# Patient Record
Sex: FEMALE | Race: WHITE | HISPANIC OR LATINO | URBAN - METROPOLITAN AREA
[De-identification: names, ages, dates, MRNs, and addresses within clinical notes are randomized per-mention and may not be internally consistent; named-entity substitution may affect disease eponyms.]

---

## 2024-09-26 ENCOUNTER — HOSPITAL ENCOUNTER (EMERGENCY)
Facility: HOSPITAL | Age: 29
Discharge: HOME | End: 2024-09-26
Payer: MEDICAID

## 2024-09-26 VITALS
BODY MASS INDEX: 36.16 KG/M2 | SYSTOLIC BLOOD PRESSURE: 132 MMHG | RESPIRATION RATE: 20 BRPM | OXYGEN SATURATION: 100 % | TEMPERATURE: 98 F | WEIGHT: 225 LBS | HEIGHT: 66 IN | HEART RATE: 100 BPM | DIASTOLIC BLOOD PRESSURE: 86 MMHG

## 2024-09-26 DIAGNOSIS — O46.90 VAGINAL BLEEDING IN PREGNANCY: ICD-10-CM

## 2024-09-26 DIAGNOSIS — O20.0 THREATENED MISCARRIAGE: Primary | ICD-10-CM

## 2024-09-26 LAB
ABO + RH BLD: NORMAL
ALBUMIN SERPL BCP-MCNC: 4.5 G/DL (ref 3.5–5.2)
ALP SERPL-CCNC: 91 U/L (ref 38–126)
ALT SERPL W/O P-5'-P-CCNC: 17 U/L (ref 10–44)
ANION GAP SERPL CALC-SCNC: 8 MMOL/L (ref 8–16)
AST SERPL-CCNC: 22 U/L (ref 15–46)
B-HCG UR QL: NEGATIVE
BACTERIA #/AREA URNS AUTO: ABNORMAL /HPF
BASOPHILS # BLD AUTO: 0.07 K/UL (ref 0–0.2)
BASOPHILS NFR BLD: 0.6 % (ref 0–1.9)
BILIRUB SERPL-MCNC: 0.4 MG/DL (ref 0.1–1)
BILIRUB UR QL STRIP: NEGATIVE
CALCIUM SERPL-MCNC: 9.2 MG/DL (ref 8.7–10.5)
CAOX CRY UR QL COMP ASSIST: ABNORMAL
CHLORIDE SERPL-SCNC: 103 MMOL/L (ref 95–110)
CLARITY UR REFRACT.AUTO: CLEAR
CO2 SERPL-SCNC: 26 MMOL/L (ref 23–29)
COLOR UR AUTO: YELLOW
CREAT SERPL-MCNC: 0.55 MG/DL (ref 0.5–1.4)
CTP QC/QA: YES
DIFFERENTIAL METHOD BLD: ABNORMAL
EOSINOPHIL # BLD AUTO: 0.3 K/UL (ref 0–0.5)
EOSINOPHIL NFR BLD: 2.3 % (ref 0–8)
ERYTHROCYTE [DISTWIDTH] IN BLOOD BY AUTOMATED COUNT: 12.2 % (ref 11.5–14.5)
EST. GFR  (NO RACE VARIABLE): >60 ML/MIN/1.73 M^2
GLUCOSE SERPL-MCNC: 92 MG/DL (ref 70–110)
GLUCOSE UR QL STRIP: NEGATIVE
HCG INTACT+B SERPL-ACNC: 5262 MIU/ML
HCT VFR BLD AUTO: 40.1 % (ref 37–48.5)
HGB BLD-MCNC: 13.8 G/DL (ref 12–16)
HGB UR QL STRIP: ABNORMAL
IMM GRANULOCYTES # BLD AUTO: 0.07 K/UL (ref 0–0.04)
IMM GRANULOCYTES NFR BLD AUTO: 0.6 % (ref 0–0.5)
KETONES UR QL STRIP: NEGATIVE
LEUKOCYTE ESTERASE UR QL STRIP: NEGATIVE
LYMPHOCYTES # BLD AUTO: 3.4 K/UL (ref 1–4.8)
LYMPHOCYTES NFR BLD: 28.2 % (ref 18–48)
MCH RBC QN AUTO: 28.1 PG (ref 27–31)
MCHC RBC AUTO-ENTMCNC: 34.4 G/DL (ref 32–36)
MCV RBC AUTO: 82 FL (ref 82–98)
MICROSCOPIC COMMENT: ABNORMAL
MONOCYTES # BLD AUTO: 0.8 K/UL (ref 0.3–1)
MONOCYTES NFR BLD: 6.6 % (ref 4–15)
NEUTROPHILS # BLD AUTO: 7.5 K/UL (ref 1.8–7.7)
NEUTROPHILS NFR BLD: 61.7 % (ref 38–73)
NITRITE UR QL STRIP: NEGATIVE
NRBC BLD-RTO: 0 /100 WBC
PH UR STRIP: 6 [PH] (ref 5–8)
PLATELET # BLD AUTO: 229 K/UL (ref 150–450)
PMV BLD AUTO: 10 FL (ref 9.2–12.9)
POTASSIUM SERPL-SCNC: 3.8 MMOL/L (ref 3.5–5.1)
PROT SERPL-MCNC: 7.7 G/DL (ref 6–8.4)
PROT UR QL STRIP: NEGATIVE
RBC # BLD AUTO: 4.91 M/UL (ref 4–5.4)
RBC #/AREA URNS AUTO: 6 /HPF (ref 0–4)
SODIUM SERPL-SCNC: 137 MMOL/L (ref 136–145)
SP GR UR STRIP: >=1.03 (ref 1–1.03)
URN SPEC COLLECT METH UR: ABNORMAL
UROBILINOGEN UR STRIP-ACNC: NEGATIVE EU/DL
UUN UR-MCNC: 10 MG/DL (ref 7–17)
WBC # BLD AUTO: 12.2 K/UL (ref 3.9–12.7)
WBC #/AREA URNS AUTO: 4 /HPF (ref 0–5)

## 2024-09-26 PROCEDURE — 81025 URINE PREGNANCY TEST: CPT | Mod: ER | Performed by: PHYSICIAN ASSISTANT

## 2024-09-26 PROCEDURE — 81000 URINALYSIS NONAUTO W/SCOPE: CPT | Mod: ER | Performed by: PHYSICIAN ASSISTANT

## 2024-09-26 PROCEDURE — 99284 EMERGENCY DEPT VISIT MOD MDM: CPT | Mod: 25,ER

## 2024-09-26 PROCEDURE — 86901 BLOOD TYPING SEROLOGIC RH(D): CPT | Performed by: PHYSICIAN ASSISTANT

## 2024-09-26 PROCEDURE — 85025 COMPLETE CBC W/AUTO DIFF WBC: CPT | Mod: ER | Performed by: PHYSICIAN ASSISTANT

## 2024-09-26 PROCEDURE — 86900 BLOOD TYPING SEROLOGIC ABO: CPT | Performed by: PHYSICIAN ASSISTANT

## 2024-09-26 PROCEDURE — 80053 COMPREHEN METABOLIC PANEL: CPT | Mod: ER | Performed by: PHYSICIAN ASSISTANT

## 2024-09-26 PROCEDURE — 96360 HYDRATION IV INFUSION INIT: CPT | Mod: ER

## 2024-09-26 PROCEDURE — 84702 CHORIONIC GONADOTROPIN TEST: CPT | Mod: ER | Performed by: PHYSICIAN ASSISTANT

## 2024-09-26 PROCEDURE — 25000003 PHARM REV CODE 250: Mod: ER | Performed by: PHYSICIAN ASSISTANT

## 2024-09-26 RX ADMIN — SODIUM CHLORIDE 1000 ML: 9 INJECTION, SOLUTION INTRAVENOUS at 05:09

## 2024-09-26 NOTE — Clinical Note
"Anan "Anna" Ángel Alcaraz was seen and treated in our emergency department on 9/26/2024.  She may return to work on 09/30/2024.       If you have any questions or concerns, please don't hesitate to call.      Lindsay Keys PA-C"

## 2024-09-26 NOTE — ED PROVIDER NOTES
Encounter Date: 2024       History     Chief Complaint   Patient presents with    Vaginal Bleeding     Pt requesting mother translate. Pt reports abd pain while at work and then vaginal bleeding. LMP in July. Pt scheduled to see OBGYN on . PA in room.     29-year-old female presenting to ED accompanied by her mother who is assisting with translation reporting that patient while at work earlier today noticed slight lower abdominal discomfort progressive throughout the day.  Patient reports going home just prior to arrival and noticing small amount of blood on toilet paper and when standing up in the toilet.  Patient reports discomfort to lower abdomen currently 5/10.  Patient denies any associated nausea, vomiting or diarrhea.  Patient reports she is currently pregnant, LMP 2024, not sure of exact date.  Patient does have an appointment to establish care with OBGYN next week.  Patient with history .  First pregnancy was vaginal delivery with no complications reported.    The history is provided by the patient. A  was used.     Review of patient's allergies indicates:  No Known Allergies  History reviewed. No pertinent past medical history.  History reviewed. No pertinent surgical history.  No family history on file.  Social History     Tobacco Use    Smoking status: Never    Smokeless tobacco: Never   Substance Use Topics    Alcohol use: Never     Review of Systems   Constitutional:  Negative for chills, diaphoresis, fatigue and fever.   HENT:  Negative for congestion, ear pain, sinus pain, sore throat and trouble swallowing.    Eyes:  Negative for photophobia, pain, redness and visual disturbance.   Respiratory:  Negative for cough, shortness of breath, wheezing and stridor.    Cardiovascular:  Negative for chest pain and palpitations.   Gastrointestinal:  Positive for abdominal pain. Negative for constipation, diarrhea, nausea and vomiting.   Endocrine: Negative.     Genitourinary:  Positive for vaginal bleeding. Negative for decreased urine volume, difficulty urinating, dysuria, flank pain, hematuria, pelvic pain, urgency, vaginal discharge and vaginal pain.   Musculoskeletal:  Negative for back pain, myalgias and neck pain.   Skin: Negative.    Allergic/Immunologic: Negative.    Neurological:  Negative for dizziness, seizures, speech difficulty, weakness, light-headedness, numbness and headaches.   Hematological: Negative.    Psychiatric/Behavioral: Negative.     All other systems reviewed and are negative.      Physical Exam     Initial Vitals [09/26/24 1658]   BP Pulse Resp Temp SpO2   132/86 110 20 98.1 °F (36.7 °C) 100 %      MAP       --         Physical Exam    Nursing note and vitals reviewed.  Constitutional: Vital signs are normal. She appears well-developed and well-nourished. She is not diaphoretic. No distress.   29-year-old female sitting upright appearing slightly anxious although no acute distress, nontoxic, breathing comfortably on room air, normal steady gait   HENT:   Head: Normocephalic and atraumatic.   Right Ear: Hearing and external ear normal.   Left Ear: Hearing and external ear normal.   Nose: Nose normal.   Mouth/Throat: Oropharynx is clear and moist.   Eyes: Conjunctivae and EOM are normal. Pupils are equal, round, and reactive to light.   Neck: Trachea normal. Neck supple.   Normal range of motion.  Cardiovascular:  Normal rate, regular rhythm and normal pulses.           Pulmonary/Chest: Effort normal. No accessory muscle usage. No tachypnea. No respiratory distress.   Abdominal: Abdomen is soft. There is no abdominal tenderness.   Abdomen soft and nontender throughout all quadrants, no guarding or rebound, no rigidity, no signs of acute abdomen There is no rebound and no guarding.   Musculoskeletal:         General: No tenderness or edema. Normal range of motion.      Cervical back: Normal range of motion and neck supple.     Neurological: She  is alert and oriented to person, place, and time. She has normal strength. She displays no tremor. She exhibits normal muscle tone. She displays no seizure activity. Coordination normal. GCS score is 15. GCS eye subscore is 4. GCS verbal subscore is 5. GCS motor subscore is 6.   Skin: Skin is warm and dry. Capillary refill takes less than 2 seconds.         ED Course   Procedures  Labs Reviewed   CBC W/ AUTO DIFFERENTIAL - Abnormal       Result Value    WBC 12.20      RBC 4.91      Hemoglobin 13.8      Hematocrit 40.1      MCV 82      MCH 28.1      MCHC 34.4      RDW 12.2      Platelets 229      MPV 10.0      Immature Granulocytes 0.6 (*)     Gran # (ANC) 7.5      Immature Grans (Abs) 0.07 (*)     Lymph # 3.4      Mono # 0.8      Eos # 0.3      Baso # 0.07      nRBC 0      Gran % 61.7      Lymph % 28.2      Mono % 6.6      Eosinophil % 2.3      Basophil % 0.6      Differential Method Automated      Narrative:     Release to patient->Immediate   COMPREHENSIVE METABOLIC PANEL    Sodium 137      Potassium 3.8      Chloride 103      CO2 26      Glucose 92      BUN 10      Creatinine 0.55      Calcium 9.2      Total Protein 7.7      Albumin 4.5      Total Bilirubin 0.4      Alkaline Phosphatase 91      AST 22      ALT 17      Anion Gap 8      eGFR >60.0      Narrative:     Release to patient->Immediate   HCG, QUANTITATIVE    HCG Quant 5262      Narrative:     Release to patient->Immediate   URINALYSIS, REFLEX TO URINE CULTURE   URINALYSIS MICROSCOPIC   POCT URINE PREGNANCY    POC Preg Test, Ur Negative       Acceptable Yes     GROUP & RH          Imaging Results              US OB <14 Wks, TransAbd, Single Gestation (No Result on File)                      Medications   sodium chloride 0.9% bolus 1,000 mL 1,000 mL (1,000 mLs Intravenous New Bag 9/26/24 2053)     Medical Decision Making  29-year-old female reporting to ED reporting that she was pregnant.  Does not report any home positive UPT.  Last  menstrual cycle at some point in July, unknown definitive date.  Patient with intermittent lower abdominal discomfort and noticed spotting blood this afternoon when using restroom and wiping.  Abdominal exam completely unremarkable.  No current vaginal bleeding in the emergency department.  Vital signs stable.  Labs ordered preemptively for evaluation including ultrasound.  UPT resulted as negative.  HCG quite low as well.  With negative UPT ultrasound cancelled.  Discussed with patient and family at bedside possible threatened miscarriage versus ectopic pregnancy.  Patient has an appointment with her OBGYN next week which is reasonable although patient and mother instructed to call clinic tomorrow to see if they can be seen sooner.  Labs overall stable and unremarkable at this time.  No further emergency intervention currently indicated.  Patient and mother agreeable to care plan and appreciative for care today.  Patient discharged in stable condition with all questions answered.        Amount and/or Complexity of Data Reviewed  Labs: ordered. Decision-making details documented in ED Course.  Radiology: ordered.               ED Course as of 09/26/24 1838   Thu Sep 26, 2024   1700 Temp: 98.1 °F (36.7 °C) [MC]   1700 BP: 132/86 [MC]   1824 hCG Qualitative, Urine: Negative [MC]   1825 Beta HCG Quant: 5262 []      ED Course User Index  [] Lindsay Keys, PAJetC                           Clinical Impression:  Final diagnoses:  [O46.90] Vaginal bleeding in pregnancy  [O20.0] Threatened miscarriage (Primary)          ED Disposition Condition    Discharge Stable          ED Prescriptions    None       Follow-up Information       Follow up With Specialties Details Why Contact Info    Linda Simental MD Obstetrics Call in 1 day  [address] [place] [address]  [phone]      [facility] - Emergency Dept Emergency Medicine Go to  If symptoms worsen [address] [facility]  Emergency Department  [facility]  [address]  [phone]    PRIMARY CARE MD  Schedule an appointment as soon as possible for a visit in 3 days If symptoms worsen           PATIENT SEEN BY MARGE ONLY.     Lindsay Keys PA-C  09/26/24 1834       Lindsay Keys PA-C  09/26/24 4794

## 2024-10-08 ENCOUNTER — HOSPITAL ENCOUNTER (EMERGENCY)
Facility: HOSPITAL | Age: 29
Discharge: HOME | End: 2024-10-08
Attending: EMERGENCY MEDICINE
Payer: MEDICAID

## 2024-10-08 VITALS
DIASTOLIC BLOOD PRESSURE: 78 MMHG | HEIGHT: 65 IN | SYSTOLIC BLOOD PRESSURE: 110 MMHG | OXYGEN SATURATION: 98 % | RESPIRATION RATE: 18 BRPM | WEIGHT: 223 LBS | TEMPERATURE: 98 F | HEART RATE: 93 BPM | BODY MASS INDEX: 37.15 KG/M2

## 2024-10-08 DIAGNOSIS — O03.9 MISCARRIAGE: ICD-10-CM

## 2024-10-08 DIAGNOSIS — R82.71 BACTERIA IN URINE: Primary | ICD-10-CM

## 2024-10-08 DIAGNOSIS — R07.9 CHEST PAIN: ICD-10-CM

## 2024-10-08 LAB
ALBUMIN SERPL BCP-MCNC: 4.3 G/DL (ref 3.5–5.2)
ALP SERPL-CCNC: 102 U/L (ref 38–126)
ALT SERPL W/O P-5'-P-CCNC: 17 U/L (ref 10–44)
ANION GAP SERPL CALC-SCNC: 6 MMOL/L (ref 8–16)
AST SERPL-CCNC: 19 U/L (ref 15–46)
B-HCG UR QL: POSITIVE
BACTERIA #/AREA URNS AUTO: ABNORMAL /HPF
BASOPHILS # BLD AUTO: 0.06 K/UL (ref 0–0.2)
BASOPHILS NFR BLD: 0.6 % (ref 0–1.9)
BILIRUB SERPL-MCNC: 0.3 MG/DL (ref 0.1–1)
BILIRUB UR QL STRIP: NEGATIVE
CALCIUM SERPL-MCNC: 9 MG/DL (ref 8.7–10.5)
CHLORIDE SERPL-SCNC: 103 MMOL/L (ref 95–110)
CLARITY UR REFRACT.AUTO: ABNORMAL
CO2 SERPL-SCNC: 26 MMOL/L (ref 23–29)
COLOR UR AUTO: YELLOW
CREAT SERPL-MCNC: 0.54 MG/DL (ref 0.5–1.4)
CTP QC/QA: YES
D DIMER PPP IA.FEU-MCNC: <0.19 MG/L FEU
DIFFERENTIAL METHOD BLD: ABNORMAL
EOSINOPHIL # BLD AUTO: 0.3 K/UL (ref 0–0.5)
EOSINOPHIL NFR BLD: 3.2 % (ref 0–8)
ERYTHROCYTE [DISTWIDTH] IN BLOOD BY AUTOMATED COUNT: 12.6 % (ref 11.5–14.5)
EST. GFR  (NO RACE VARIABLE): >60 ML/MIN/1.73 M^2
GLUCOSE SERPL-MCNC: 106 MG/DL (ref 70–110)
GLUCOSE UR QL STRIP: NEGATIVE
HCT VFR BLD AUTO: 32.1 % (ref 37–48.5)
HGB BLD-MCNC: 10.9 G/DL (ref 12–16)
HGB UR QL STRIP: ABNORMAL
IMM GRANULOCYTES # BLD AUTO: 0.08 K/UL (ref 0–0.04)
IMM GRANULOCYTES NFR BLD AUTO: 0.8 % (ref 0–0.5)
KETONES UR QL STRIP: NEGATIVE
LEUKOCYTE ESTERASE UR QL STRIP: ABNORMAL
LYMPHOCYTES # BLD AUTO: 3.2 K/UL (ref 1–4.8)
LYMPHOCYTES NFR BLD: 29.8 % (ref 18–48)
MCH RBC QN AUTO: 27.8 PG (ref 27–31)
MCHC RBC AUTO-ENTMCNC: 34 G/DL (ref 32–36)
MCV RBC AUTO: 82 FL (ref 82–98)
MICROSCOPIC COMMENT: ABNORMAL
MONOCYTES # BLD AUTO: 0.8 K/UL (ref 0.3–1)
MONOCYTES NFR BLD: 7.2 % (ref 4–15)
NEUTROPHILS # BLD AUTO: 6.2 K/UL (ref 1.8–7.7)
NEUTROPHILS NFR BLD: 58.4 % (ref 38–73)
NITRITE UR QL STRIP: NEGATIVE
NRBC BLD-RTO: 0 /100 WBC
OHS QRS DURATION: 84 MS
OHS QTC CALCULATION: 476 MS
PH UR STRIP: 7 [PH] (ref 5–8)
PLATELET # BLD AUTO: 260 K/UL (ref 150–450)
PMV BLD AUTO: 10 FL (ref 9.2–12.9)
POTASSIUM SERPL-SCNC: 4.1 MMOL/L (ref 3.5–5.1)
PROT SERPL-MCNC: 7.4 G/DL (ref 6–8.4)
PROT UR QL STRIP: NEGATIVE
RBC # BLD AUTO: 3.92 M/UL (ref 4–5.4)
RBC #/AREA URNS AUTO: >100 /HPF (ref 0–4)
SARS-COV-2 RDRP RESP QL NAA+PROBE: NEGATIVE
SODIUM SERPL-SCNC: 135 MMOL/L (ref 136–145)
SP GR UR STRIP: 1.01 (ref 1–1.03)
SQUAMOUS #/AREA URNS AUTO: 10 /HPF
TROPONIN I SERPL-MCNC: <0.012 NG/ML (ref 0.01–0.03)
URN SPEC COLLECT METH UR: ABNORMAL
UROBILINOGEN UR STRIP-ACNC: NEGATIVE EU/DL
UUN UR-MCNC: 11 MG/DL (ref 7–17)
WBC # BLD AUTO: 10.62 K/UL (ref 3.9–12.7)
WBC #/AREA URNS AUTO: >100 /HPF (ref 0–5)

## 2024-10-08 PROCEDURE — 85025 COMPLETE CBC W/AUTO DIFF WBC: CPT | Mod: ER | Performed by: EMERGENCY MEDICINE

## 2024-10-08 PROCEDURE — 80053 COMPREHEN METABOLIC PANEL: CPT | Mod: ER | Performed by: EMERGENCY MEDICINE

## 2024-10-08 PROCEDURE — 93005 ELECTROCARDIOGRAM TRACING: CPT | Mod: ER

## 2024-10-08 PROCEDURE — 81000 URINALYSIS NONAUTO W/SCOPE: CPT | Mod: ER | Performed by: EMERGENCY MEDICINE

## 2024-10-08 PROCEDURE — 87086 URINE CULTURE/COLONY COUNT: CPT | Mod: ER | Performed by: EMERGENCY MEDICINE

## 2024-10-08 PROCEDURE — 96374 THER/PROPH/DIAG INJ IV PUSH: CPT | Mod: ER

## 2024-10-08 PROCEDURE — 81025 URINE PREGNANCY TEST: CPT | Mod: ER | Performed by: EMERGENCY MEDICINE

## 2024-10-08 PROCEDURE — 99285 EMERGENCY DEPT VISIT HI MDM: CPT | Mod: 25,ER

## 2024-10-08 PROCEDURE — U0002 COVID-19 LAB TEST NON-CDC: HCPCS | Mod: ER | Performed by: EMERGENCY MEDICINE

## 2024-10-08 PROCEDURE — 84484 ASSAY OF TROPONIN QUANT: CPT | Mod: ER | Performed by: EMERGENCY MEDICINE

## 2024-10-08 PROCEDURE — 93010 ELECTROCARDIOGRAM REPORT: CPT | Mod: ,,, | Performed by: INTERNAL MEDICINE

## 2024-10-08 PROCEDURE — 25000003 PHARM REV CODE 250: Mod: ER | Performed by: EMERGENCY MEDICINE

## 2024-10-08 PROCEDURE — 85379 FIBRIN DEGRADATION QUANT: CPT | Mod: ER | Performed by: EMERGENCY MEDICINE

## 2024-10-08 PROCEDURE — 63600175 PHARM REV CODE 636 W HCPCS: Mod: ER | Performed by: EMERGENCY MEDICINE

## 2024-10-08 RX ORDER — CEPHALEXIN 500 MG/1
500 CAPSULE ORAL 4 TIMES DAILY
Qty: 28 CAPSULE | Refills: 0 | Status: SHIPPED | OUTPATIENT
Start: 2024-10-08 | End: 2024-10-15

## 2024-10-08 RX ORDER — DICLOFENAC SODIUM 75 MG/1
75 TABLET, DELAYED RELEASE ORAL 2 TIMES DAILY
Qty: 60 TABLET | Refills: 0 | Status: SHIPPED | OUTPATIENT
Start: 2024-10-08

## 2024-10-08 RX ORDER — KETOROLAC TROMETHAMINE 30 MG/ML
15 INJECTION, SOLUTION INTRAMUSCULAR; INTRAVENOUS
Status: COMPLETED | OUTPATIENT
Start: 2024-10-08 | End: 2024-10-08

## 2024-10-08 RX ORDER — CEPHALEXIN 500 MG/1
500 CAPSULE ORAL
Status: COMPLETED | OUTPATIENT
Start: 2024-10-08 | End: 2024-10-08

## 2024-10-08 RX ADMIN — CEPHALEXIN 500 MG: 500 CAPSULE ORAL at 02:10

## 2024-10-08 RX ADMIN — KETOROLAC TROMETHAMINE 15 MG: 30 INJECTION, SOLUTION INTRAMUSCULAR at 02:10

## 2024-10-08 NOTE — ED PROVIDER NOTES
Encounter Date: 10/8/2024       History     Chief Complaint   Patient presents with    Chest Pain     Pt c/o chest pain with dizziness that just started tonight. Pt also stated she having lower back pain. Pt having vaginal bleeding still since the miscarriage 8 days ago that has a foul smell.     HPI  29 y.o.    used    Co upper chest pain, feeling congested, intermittent abd pain  No leg pan nor swelling  No uri sx  No ripping no migratory pain  No cough nor hemoptysis  No exac/remitting factors  Mod  Nr    Chart review shows incomplete miscarriage tx'd with cytotec about 5 days ago    Review of patient's allergies indicates:  No Known Allergies  History reviewed. No pertinent past medical history.  History reviewed. No pertinent surgical history.  No family history on file.  Social History     Tobacco Use    Smoking status: Never    Smokeless tobacco: Never   Substance Use Topics    Alcohol use: Never     Review of Systems  All systems were reviewed/examined and were negative except as noted in the HPI.    Physical Exam     Initial Vitals [10/08/24 0052]   BP Pulse Resp Temp SpO2   138/86 104 (!) 21 98.1 °F (36.7 °C) 99 %      MAP       --         Physical Exam    General: the patient is awake, alert, and in no apparent distress.  Head: normocephalic and atraumatic, sclera are clear  Neck: supple without meningismus  Chest: clear to auscultation bilaterally, no respiratory distress  Heart: regular rate and rhythm  ABD soft, nontender, nondistended, no peritoneal signs  No calf t no edema  Extremities: warm and well perfused  Skin: warm and dry  Psych conversant  Neuro: awake, alert, moving all extremities    ED Course   Procedures  Labs Reviewed   CBC W/ AUTO DIFFERENTIAL - Abnormal       Result Value    WBC 10.62      RBC 3.92 (*)     Hemoglobin 10.9 (*)     Hematocrit 32.1 (*)     MCV 82      MCH 27.8      MCHC 34.0      RDW 12.6      Platelets 260      MPV 10.0      Immature Granulocytes 0.8 (*)      Gran # (ANC) 6.2      Immature Grans (Abs) 0.08 (*)     Lymph # 3.2      Mono # 0.8      Eos # 0.3      Baso # 0.06      nRBC 0      Gran % 58.4      Lymph % 29.8      Mono % 7.2      Eosinophil % 3.2      Basophil % 0.6      Differential Method Automated     COMPREHENSIVE METABOLIC PANEL - Abnormal    Sodium 135 (*)     Potassium 4.1      Chloride 103      CO2 26      Glucose 106      BUN 11      Creatinine 0.54      Calcium 9.0      Total Protein 7.4      Albumin 4.3      Total Bilirubin 0.3      Alkaline Phosphatase 102      AST 19      ALT 17      Anion Gap 6 (*)     eGFR >60.0     URINALYSIS, REFLEX TO URINE CULTURE - Abnormal    Specimen UA Urine, Clean Catch      Color, UA Yellow      Appearance, UA Hazy (*)     pH, UA 7.0      Specific Gravity, UA 1.010      Protein, UA Negative      Glucose, UA Negative      Ketones, UA Negative      Bilirubin (UA) Negative      Occult Blood UA 3+ (*)     Nitrite, UA Negative      Urobilinogen, UA Negative      Leukocytes, UA 1+ (*)     Narrative:     Preferred Collection Type->Urine, Clean Catch  Specimen Source->Urine   URINALYSIS MICROSCOPIC - Abnormal    RBC, UA >100 (*)     WBC, UA >100 (*)     Bacteria Moderate (*)     Squam Epithel, UA 10      Microscopic Comment SEE COMMENT      Narrative:     Preferred Collection Type->Urine, Clean Catch  Specimen Source->Urine   POCT URINE PREGNANCY - Abnormal    POC Preg Test, Ur Positive (*)      Acceptable Yes     CULTURE, URINE   TROPONIN I    Troponin I <0.012     SARS-COV-2 RNA AMPLIFICATION, QUAL    SARS-CoV-2 RNA, Amplification, Qual Negative     D DIMER, QUANTITATIVE    D-Dimer <0.19          ECG Results              EKG 12-lead (Final result)        Collection Time Result Time QRS Duration OHS QTC Calculation    10/08/24 01:11:05 10/08/24 14:47:40 84 476                     Final result by Interface, Lab In [facility] (10/08/24 14:47:46)                   Narrative:    Test Reason : R07.9,    Vent. Rate : 103  BPM     Atrial Rate : 103 BPM     P-R Int : 116 ms          QRS Dur : 084 ms      QT Int : 364 ms       P-R-T Axes : 040 047 030 degrees     QTc Int : 476 ms    Sinus tachycardia  Otherwise normal ECG  No previous ECGs available  Confirmed by Shon Macias MD ([id number]) on 10/8/2024 2:47:34 PM    Referred By: OSBALDO   SELF           Confirmed By:Shon Macias MD                                  Imaging Results              X-Ray Chest AP Portable (Final result)  Result time 10/08/24 01:51:05      Final result by Jovan Villarreal MD (10/08/24 01:51:05)                   Impression:      No convincing radiographic evidence of acute intrathoracic process on this single view.      Electronically signed by: Jovan Villarreal MD  Date:    10/08/2024  Time:    01:51               Narrative:    EXAMINATION:  XR CHEST AP PORTABLE    CLINICAL HISTORY:  Chest Pain;    TECHNIQUE:  Single frontal view of the chest was performed.    COMPARISON:  None    FINDINGS:  Cardiac monitoring leads overlie the chest.  Cardiac silhouette is not significantly enlarged.  No confluent airspace consolidation appreciated throughout the lungs.  No significant volume of pleural fluid or pneumothorax identified.  Osseous structures appear intact.                                       Medications   ketorolac injection 15 mg (15 mg Intravenous Given 10/8/24 0234)   cephALEXin capsule 500 mg (500 mg Oral Given 10/8/24 0239)     Medical Decision Making                 Medical Decision Making:    This is an emergent evaluation of a patient presenting to the ED.  Nursing notes were reviewed.  I personally reviewed, read, and interpreted the ECG and any monitoring strips.  ECG: normal EKG, sinus tach rhythm, unchanged from previous tracings   There are no critical interval prolongations nor critical ST-segment ischemic changes.  Compared with prior if available.  Read and interpreted by me independently.      I reviewed radiology images personally along  with interpretations.  Cxr neg  I personally reviewed and interpreted the laboratory results.  Labs non critical  Ddimer not elev  Bacturia  Hcg positive, needs to fu with OB GYN  I decided to obtain and review old medical records, which showed: ED visits    Evaluation for Emergency Medical Condition  The patient received a medical screening exam and within a reasonable degree of clinical confidence an emergency medical condition has not been identified.  The patient is instructed on proper follow up and return precautions to the ED.      Ty Chowdhury MD, BABAK                     Clinical Impression:  Final diagnoses:  [R07.9] Chest pain  [R82.71] Bacteria in urine (Primary)  [O03.9] Miscarriage          ED Disposition Condition    Discharge Stable          ED Prescriptions       Medication Sig Dispense Start Date End Date Auth. Provider    diclofenac (VOLTAREN) 75 MG EC tablet Take 1 tablet (75 mg total) by mouth 2 (two) times daily. 60 tablet 10/8/2024 -- Basilio Chowdhury MD    cephALEXin (KEFLEX) 500 MG capsule Take 1 capsule (500 mg total) by mouth 4 (four) times daily. for 7 days 28 capsule 10/8/2024 10/15/2024 Basilio Chowdhury MD          Follow-up Information       Follow up With Specialties Details Why Contact Info    Linda Simental MD Obstetrics Schedule an appointment as soon as possible for a visit   [address]  [place] [address]  [phone]            Discharged to home in stable condition, return to ED warnings given, follow up and patient care instructions given.      Ty Chowdhury MD, BABAK, [facility]  Department of Emergency Medicine       Basilio Chowdhury MD  10/09/24 4980

## 2024-10-08 NOTE — ED NOTES
Patient wanted to ambulated to the restroom with urine cup given. Staff waiting at bedside to do the EKG.

## 2024-10-09 LAB — BACTERIA UR CULT: NO GROWTH

## 2024-12-24 ENCOUNTER — HOSPITAL ENCOUNTER (EMERGENCY)
Facility: HOSPITAL | Age: 29
Discharge: HOME | End: 2024-12-24
Attending: EMERGENCY MEDICINE
Payer: MEDICAID

## 2024-12-24 VITALS
WEIGHT: 230 LBS | DIASTOLIC BLOOD PRESSURE: 89 MMHG | HEART RATE: 109 BPM | RESPIRATION RATE: 18 BRPM | BODY MASS INDEX: 38.32 KG/M2 | OXYGEN SATURATION: 100 % | HEIGHT: 65 IN | TEMPERATURE: 99 F | SYSTOLIC BLOOD PRESSURE: 145 MMHG

## 2024-12-24 DIAGNOSIS — B34.9 VIRAL SYNDROME: Primary | ICD-10-CM

## 2024-12-24 DIAGNOSIS — R07.89 CHEST DISCOMFORT: ICD-10-CM

## 2024-12-24 LAB
ALBUMIN SERPL BCP-MCNC: 4.7 G/DL (ref 3.5–5.2)
ALP SERPL-CCNC: 91 U/L (ref 38–126)
ALT SERPL W/O P-5'-P-CCNC: 20 U/L (ref 10–44)
ANION GAP SERPL CALC-SCNC: 9 MMOL/L (ref 8–16)
AST SERPL-CCNC: 21 U/L (ref 15–46)
B-HCG UR QL: NEGATIVE
BACTERIA #/AREA URNS AUTO: ABNORMAL /HPF
BASOPHILS # BLD AUTO: 0.05 K/UL (ref 0–0.2)
BASOPHILS NFR BLD: 0.5 % (ref 0–1.9)
BILIRUB SERPL-MCNC: 1.2 MG/DL (ref 0.1–1)
BILIRUB UR QL STRIP: NEGATIVE
CALCIUM SERPL-MCNC: 9.6 MG/DL (ref 8.7–10.5)
CHLORIDE SERPL-SCNC: 99 MMOL/L (ref 95–110)
CLARITY UR REFRACT.AUTO: ABNORMAL
CO2 SERPL-SCNC: 26 MMOL/L (ref 23–29)
COLOR UR AUTO: YELLOW
CREAT SERPL-MCNC: 0.6 MG/DL (ref 0.5–1.4)
CTP QC/QA: YES
DIFFERENTIAL METHOD BLD: ABNORMAL
EOSINOPHIL # BLD AUTO: 0.2 K/UL (ref 0–0.5)
EOSINOPHIL NFR BLD: 1.4 % (ref 0–8)
ERYTHROCYTE [DISTWIDTH] IN BLOOD BY AUTOMATED COUNT: 13.2 % (ref 11.5–14.5)
EST. GFR  (NO RACE VARIABLE): >60 ML/MIN/1.73 M^2
GLUCOSE SERPL-MCNC: 107 MG/DL (ref 70–110)
GLUCOSE UR QL STRIP: NEGATIVE
HCT VFR BLD AUTO: 40.4 % (ref 37–48.5)
HGB BLD-MCNC: 13.5 G/DL (ref 12–16)
HGB UR QL STRIP: ABNORMAL
IMM GRANULOCYTES # BLD AUTO: 0.04 K/UL (ref 0–0.04)
IMM GRANULOCYTES NFR BLD AUTO: 0.4 % (ref 0–0.5)
INFLUENZA A, MOLECULAR: NEGATIVE
INFLUENZA B, MOLECULAR: NEGATIVE
KETONES UR QL STRIP: ABNORMAL
LEUKOCYTE ESTERASE UR QL STRIP: NEGATIVE
LYMPHOCYTES # BLD AUTO: 2.7 K/UL (ref 1–4.8)
LYMPHOCYTES NFR BLD: 24.6 % (ref 18–48)
MAGNESIUM SERPL-MCNC: 1.9 MG/DL (ref 1.6–2.6)
MCH RBC QN AUTO: 25.4 PG (ref 27–31)
MCHC RBC AUTO-ENTMCNC: 33.4 G/DL (ref 32–36)
MCV RBC AUTO: 76 FL (ref 82–98)
MICROSCOPIC COMMENT: ABNORMAL
MONOCYTES # BLD AUTO: 0.6 K/UL (ref 0.3–1)
MONOCYTES NFR BLD: 5.7 % (ref 4–15)
NEUTROPHILS # BLD AUTO: 7.3 K/UL (ref 1.8–7.7)
NEUTROPHILS NFR BLD: 67.4 % (ref 38–73)
NITRITE UR QL STRIP: NEGATIVE
NRBC BLD-RTO: 0 /100 WBC
PH UR STRIP: 6 [PH] (ref 5–8)
PLATELET # BLD AUTO: 262 K/UL (ref 150–450)
PMV BLD AUTO: 10.3 FL (ref 9.2–12.9)
POCT GLUCOSE: 111 MG/DL (ref 70–110)
POTASSIUM SERPL-SCNC: 3.7 MMOL/L (ref 3.5–5.1)
PROT SERPL-MCNC: 8.3 G/DL (ref 6–8.4)
PROT UR QL STRIP: ABNORMAL
RBC # BLD AUTO: 5.31 M/UL (ref 4–5.4)
RBC #/AREA URNS AUTO: 2 /HPF (ref 0–4)
SARS-COV-2 RDRP RESP QL NAA+PROBE: NEGATIVE
SODIUM SERPL-SCNC: 134 MMOL/L (ref 136–145)
SP GR UR STRIP: >=1.03 (ref 1–1.03)
SPECIMEN SOURCE: NORMAL
SQUAMOUS #/AREA URNS AUTO: 25 /HPF
TROPONIN I SERPL-MCNC: <0.012 NG/ML (ref 0.01–0.03)
URN SPEC COLLECT METH UR: ABNORMAL
UROBILINOGEN UR STRIP-ACNC: NEGATIVE EU/DL
UUN UR-MCNC: 13 MG/DL (ref 7–17)
WBC # BLD AUTO: 10.82 K/UL (ref 3.9–12.7)
WBC #/AREA URNS AUTO: 2 /HPF (ref 0–5)

## 2024-12-24 PROCEDURE — 25000003 PHARM REV CODE 250: Mod: ER | Performed by: EMERGENCY MEDICINE

## 2024-12-24 PROCEDURE — 93010 ELECTROCARDIOGRAM REPORT: CPT | Mod: ,,, | Performed by: INTERNAL MEDICINE

## 2024-12-24 PROCEDURE — 93005 ELECTROCARDIOGRAM TRACING: CPT | Mod: ER

## 2024-12-24 PROCEDURE — 81025 URINE PREGNANCY TEST: CPT | Mod: ER | Performed by: EMERGENCY MEDICINE

## 2024-12-24 PROCEDURE — 82962 GLUCOSE BLOOD TEST: CPT | Mod: ER

## 2024-12-24 PROCEDURE — 83735 ASSAY OF MAGNESIUM: CPT | Mod: ER | Performed by: EMERGENCY MEDICINE

## 2024-12-24 PROCEDURE — 84484 ASSAY OF TROPONIN QUANT: CPT | Mod: ER | Performed by: EMERGENCY MEDICINE

## 2024-12-24 PROCEDURE — 87502 INFLUENZA DNA AMP PROBE: CPT | Mod: ER | Performed by: EMERGENCY MEDICINE

## 2024-12-24 PROCEDURE — 87635 SARS-COV-2 COVID-19 AMP PRB: CPT | Mod: ER | Performed by: EMERGENCY MEDICINE

## 2024-12-24 PROCEDURE — 63600175 PHARM REV CODE 636 W HCPCS: Mod: ER | Performed by: EMERGENCY MEDICINE

## 2024-12-24 PROCEDURE — 85025 COMPLETE CBC W/AUTO DIFF WBC: CPT | Mod: ER | Performed by: EMERGENCY MEDICINE

## 2024-12-24 PROCEDURE — 96374 THER/PROPH/DIAG INJ IV PUSH: CPT | Mod: ER

## 2024-12-24 PROCEDURE — 80053 COMPREHEN METABOLIC PANEL: CPT | Mod: ER | Performed by: EMERGENCY MEDICINE

## 2024-12-24 PROCEDURE — 99285 EMERGENCY DEPT VISIT HI MDM: CPT | Mod: 25,ER

## 2024-12-24 PROCEDURE — 96375 TX/PRO/DX INJ NEW DRUG ADDON: CPT | Mod: ER

## 2024-12-24 PROCEDURE — 81000 URINALYSIS NONAUTO W/SCOPE: CPT | Mod: ER | Performed by: EMERGENCY MEDICINE

## 2024-12-24 RX ORDER — KETOROLAC TROMETHAMINE 30 MG/ML
15 INJECTION, SOLUTION INTRAMUSCULAR; INTRAVENOUS
Status: COMPLETED | OUTPATIENT
Start: 2024-12-24 | End: 2024-12-24

## 2024-12-24 RX ORDER — METOCLOPRAMIDE HYDROCHLORIDE 5 MG/ML
10 INJECTION INTRAMUSCULAR; INTRAVENOUS
Status: COMPLETED | OUTPATIENT
Start: 2024-12-24 | End: 2024-12-24

## 2024-12-24 RX ORDER — ALUMINUM HYDROXIDE, MAGNESIUM HYDROXIDE, AND SIMETHICONE 1200; 120; 1200 MG/30ML; MG/30ML; MG/30ML
30 SUSPENSION ORAL ONCE
Status: COMPLETED | OUTPATIENT
Start: 2024-12-24 | End: 2024-12-24

## 2024-12-24 RX ORDER — METOCLOPRAMIDE 10 MG/1
10 TABLET ORAL EVERY 6 HOURS PRN
Qty: 14 TABLET | Refills: 0 | Status: SHIPPED | OUTPATIENT
Start: 2024-12-24

## 2024-12-24 RX ORDER — LIDOCAINE HYDROCHLORIDE 20 MG/ML
15 SOLUTION OROPHARYNGEAL ONCE
Status: COMPLETED | OUTPATIENT
Start: 2024-12-24 | End: 2024-12-24

## 2024-12-24 RX ADMIN — LIDOCAINE HYDROCHLORIDE 15 ML: 20 SOLUTION ORAL at 03:12

## 2024-12-24 RX ADMIN — METOCLOPRAMIDE 10 MG: 5 INJECTION, SOLUTION INTRAMUSCULAR; INTRAVENOUS at 03:12

## 2024-12-24 RX ADMIN — KETOROLAC TROMETHAMINE 15 MG: 30 INJECTION, SOLUTION INTRAMUSCULAR; INTRAVENOUS at 03:12

## 2024-12-24 RX ADMIN — ALUMINUM HYDROXIDE, MAGNESIUM HYDROXIDE, AND SIMETHICONE 30 ML: 200; 200; 20 SUSPENSION ORAL at 03:12

## 2024-12-24 NOTE — ED NOTES
Pt c/o of hypertension and mid sternal chest pain that started yesterday. The pain does not radiate to another area on the body. Rates chest pain a 5/10. + for abdominal pain. + for nausea that started today. Denies headache. Denies taking medications for the pain.  She states she does have a history of hypertension however has not taken any meds for over a year.     Pt states yesterday her coworker took her blood pressure and it was 140/100 and she took one of the coworkers BP pills. Pt does not know the name of the BP pill.    Pt AAOx4. VSS. Resp even and unlabored. Bed locked in lowest position, side rail up x1. Call light in reach and family at bedside.     Pt is Israeli speaking.  used: Len  #: [phone]

## 2024-12-24 NOTE — DISCHARGE INSTRUCTIONS
Asegúrese de beber muchos líquidos.  Recomiendo murphy ibuprofeno 600 mg cada 6 horas con alimentos y/o Tylenol 650 mg cada 6 horas además del medicamento recetado para ayudar a controlar nba síntomas.  Llame a un médico de atención primaria para julio cesar evaluación y tratamiento adicionales.  Por favor regrese con cualquier síntoma nuevo o que empeore.      Here is a list of Internal Medicine/Family Medicine/Pediatric resources available in the community.      [facility]  Internal Medicine  [address], [address]  [place], [place]   Telephone [phone]  Or  [address] [place] [address], [place]  Telephone [phone]    [facility]   Internal Medicine, Family Practice  [address], [place]   Telephone [phone]    [facility] Internal Medicine  Internal Medicine  [address], [address]  [place], [place]   Telephone [phone]      [facility]  Family [facility]  [address]  [place], [place]   Telephone [phone]    Feng Hogue MD  [address], [address]  [place], [place]  Telephone [phone]    [facility]  Internal Medicine, Family Practice  [address]  [place], [place]  Telephone [phone]    [facility], Pediatrics  [address], [place]   Telephone [phone]    [facility]   Family Practice, pediatrics, OBGYN, WIK, KidMed, Shots  [address]  [place], [place]   Telephone [phone]    [facility]  Internal Medicine, Family Practice, pediatrics, OBGYN,   dentistry, Behavioral Health, WIC, shots, specialty referral,   medication assistance, diabetic education  [address]  [place], [place]   Telephone [phone]    [facility]  Family Practice  [address], [address]  [place], [place]  Telephone [phone]

## 2024-12-24 NOTE — ED PROVIDER NOTES
"Encounter Date: 12/24/2024       History     Chief Complaint   Patient presents with    Hypertension     Pt states "I have hypertension and I have been dealing with it for about a year and yesterday it was high. I have not been taking anything for it." Pt rpts diarrhea denies headache or blurred vision.     29-year-old female presents to the emergency department concerned about an elevated blood pressure reading.  Onset yesterday.  States she has been having some headaches as well as some nausea and some diarrhea.  Denies any cough or congestion or sore throat.  Had some chest discomfort yesterday that has since resolved.  Denies any shortness of breath.  States she took a dose of a coworker's blood pressure medicine yesterday because when she checked it it was in the 140s over 90s.  No other symptoms reported at this time.      Review of patient's allergies indicates:  No Known Allergies  History reviewed. No pertinent past medical history.  History reviewed. No pertinent surgical history.  No family history on file.  Social History     Tobacco Use    Smoking status: Never    Smokeless tobacco: Never   Substance Use Topics    Alcohol use: Never     Review of Systems   Constitutional:  Negative for chills and fever.   HENT:  Negative for congestion.    Respiratory:  Negative for cough and shortness of breath.    Cardiovascular:  Positive for chest pain.   Gastrointestinal:  Negative for abdominal pain.   Musculoskeletal:  Negative for back pain.   Neurological:  Positive for headaches.       Physical Exam     Initial Vitals [12/24/24 1348]   BP Pulse Resp Temp SpO2   (!) 145/89 109 18 99 °F (37.2 °C) 100 %      MAP       --         Physical Exam    Nursing note and vitals reviewed.  Constitutional: She appears well-developed and well-nourished. No distress.   HENT:   Head: Normocephalic and atraumatic.   Eyes: Conjunctivae and EOM are normal. Pupils are equal, round, and reactive to light.   Neck: Neck supple. No " tracheal deviation present.   Normal range of motion.  Cardiovascular:  Regular rhythm and intact distal pulses.           Pulmonary/Chest: No respiratory distress.   Abdominal: Abdomen is soft. She exhibits no distension. There is no abdominal tenderness.   Musculoskeletal:         General: No tenderness or edema. Normal range of motion.      Cervical back: Normal range of motion and neck supple.     Neurological: She is alert and oriented to person, place, and time. She has normal strength. No cranial nerve deficit. GCS score is 15. GCS eye subscore is 4. GCS verbal subscore is 5. GCS motor subscore is 6.   Skin: Skin is warm and dry.         ED Course   Procedures  Labs Reviewed   CBC W/ AUTO DIFFERENTIAL - Abnormal       Result Value    WBC 10.82      RBC 5.31      Hemoglobin 13.5      Hematocrit 40.4      MCV 76 (*)     MCH 25.4 (*)     MCHC 33.4      RDW 13.2      Platelets 262      MPV 10.3      Immature Granulocytes 0.4      Gran # (ANC) 7.3      Immature Grans (Abs) 0.04      Lymph # 2.7      Mono # 0.6      Eos # 0.2      Baso # 0.05      nRBC 0      Gran % 67.4      Lymph % 24.6      Mono % 5.7      Eosinophil % 1.4      Basophil % 0.5      Differential Method Automated     COMPREHENSIVE METABOLIC PANEL - Abnormal    Sodium 134 (*)     Potassium 3.7      Chloride 99      CO2 26      Glucose 107      BUN 13      Creatinine 0.60      Calcium 9.6      Total Protein 8.3      Albumin 4.7      Total Bilirubin 1.2 (*)     Alkaline Phosphatase 91      AST 21      ALT 20      Anion Gap 9      eGFR >60.0     URINALYSIS - Abnormal    Specimen UA Urine, Clean Catch      Color, UA Yellow      Appearance, UA Cloudy (*)     pH, UA 6.0      Specific Gravity, UA >=1.030 (*)     Protein, UA Trace (*)     Glucose, UA Negative      Ketones, UA Trace (*)     Bilirubin (UA) Negative      Occult Blood UA Trace (*)     Nitrite, UA Negative      Urobilinogen, UA Negative      Leukocytes, UA Negative      Narrative:     Collection  Type->Urine, Clean Catch   URINALYSIS MICROSCOPIC - Abnormal    RBC, UA 2      WBC, UA 2      Bacteria Few (*)     Squam Epithel, UA 25      Microscopic Comment SEE COMMENT      Narrative:     Collection Type->Urine, Clean Catch   POCT GLUCOSE - Abnormal    POCT Glucose 111 (*)    INFLUENZA A & B BY MOLECULAR    Influenza A, Molecular Negative      Influenza B, Molecular Negative      Flu A & B Source Nasal swab     MAGNESIUM    Magnesium 1.9     TROPONIN I    Troponin I <0.012     SARS-COV-2 RNA AMPLIFICATION, QUAL    SARS-CoV-2 RNA, Amplification, Qual Negative     POCT URINE PREGNANCY    POC Preg Test, Ur Negative       Acceptable Yes     POCT GLUCOSE MONITORING CONTINUOUS     EKG Readings: (Independently Interpreted)   Initial Reading: No STEMI. Previous EKG: Compared with most recent EKG Previous EKG Date: 10/8/2024 (Minimal change). Rhythm: Sinus Tachycardia. Heart Rate: 104. Ectopy: No Ectopy. ST Segments: Normal ST Segments. Axis: Normal.   EKG independently interpreted by me pending Cardiology review           X-Rays:   Independently Interpreted Readings:   Other Readings:  Chest x-ray independently interpreted by me pending radiology review: No acute infiltrate, no pneumothorax, no effusion    Imaging Results              X-Ray Chest AP Portable (Final result)  Result time 12/24/24 14:23:59      Final result by Santos Kraus MD (12/24/24 14:23:59)                   Impression:      No acute process seen.      Electronically signed by: Santos Kraus MD  Date:    12/24/2024  Time:    14:23               Narrative:    EXAMINATION:  XR CHEST AP PORTABLE    CLINICAL HISTORY:  Other chest pain    FINDINGS:  Single view of the chest.  Comparison 10/08/2024    Cardiac silhouette is normal.  The lungs demonstrate no evidence of active disease.  No evidence of pleural effusion or pneumothorax.  Bones appear intact.                                      Medications   ketorolac injection 15 mg (15 mg  Intravenous Given 12/24/24 1520)   aluminum-magnesium hydroxide-simethicone 200-200-20 mg/5 mL suspension 30 mL (30 mLs Oral Given 12/24/24 1520)     And   LIDOcaine viscous HCl 2% oral solution 15 mL (15 mLs Oral Given 12/24/24 1520)   metoclopramide injection 10 mg (10 mg Intravenous Given 12/24/24 1521)     Medical Decision Making  29-year-old female presents emergency department complaining of headache, diarrhea, elevated blood pressure reading, chest discomfort that has resolved      Differential: URI, COVID, influenza, viral syndrome, ACS, dissection, pneumonia, pneumothorax, anemia, dehydration,      Patient given Toradol, Reglan, and GI cocktail.  On re-evaluation she reports resolution of symptoms.  Vital signs remained stable.  Informed her of results as well as plan to discharge with prescription for antiemetic, instructions on home management, over-the-counter medications, follow up with primary care physician, strict return precautions given.  Vital signs stable.    Problems Addressed:  Chest discomfort: acute illness or injury  Viral syndrome: acute illness or injury    Amount and/or Complexity of Data Reviewed  External Data Reviewed: ECG and notes.     Details: Reviewed most recent EKG for comparison     Reviewed most recent OB note documenting baseline medications and past medical history  Labs: ordered.     Details: CBC without leukocytosis, normal H&H; CMP with normal renal and liver function tests, normal electrolytes; troponin within normal limits  Radiology: ordered and independent interpretation performed. Decision-making details documented in ED Course.  ECG/medicine tests: ordered and independent interpretation performed. Decision-making details documented in ED Course.    Risk  OTC drugs.  Prescription drug management.  Parenteral controlled substances.                                      Clinical Impression:  Final diagnoses:  [R07.89] Chest discomfort  [B34.9] Viral syndrome (Primary)           ED Disposition Condition    Discharge Stable          ED Prescriptions       Medication Sig Dispense Start Date End Date Auth. Provider    metoclopramide HCl (REGLAN) 10 MG tablet Take 1 tablet (10 mg total) by mouth every 6 (six) hours as needed (Nausea). 14 tablet 12/24/2024 -- Joey Patino MD          Follow-up Information       Follow up With Specialties Details Why Contact Info     [facility]  Schedule an appointment as soon as possible for a visit                Joey Patino MD  12/24/24 9231

## 2024-12-27 LAB
OHS QRS DURATION: 82 MS
OHS QTC CALCULATION: 454 MS